# Patient Record
Sex: FEMALE | Race: WHITE | Employment: FULL TIME | URBAN - METROPOLITAN AREA
[De-identification: names, ages, dates, MRNs, and addresses within clinical notes are randomized per-mention and may not be internally consistent; named-entity substitution may affect disease eponyms.]

---

## 2023-09-18 ENCOUNTER — OFFICE VISIT (OUTPATIENT)
Age: 28
End: 2023-09-18

## 2023-09-18 VITALS
OXYGEN SATURATION: 98 % | TEMPERATURE: 98.1 F | RESPIRATION RATE: 18 BRPM | DIASTOLIC BLOOD PRESSURE: 71 MMHG | BODY MASS INDEX: 25.44 KG/M2 | SYSTOLIC BLOOD PRESSURE: 110 MMHG | HEART RATE: 89 BPM | WEIGHT: 149 LBS

## 2023-09-18 DIAGNOSIS — E86.0 DEHYDRATION AFTER EXERTION: Primary | ICD-10-CM

## 2023-09-18 DIAGNOSIS — R42 LIGHTHEADEDNESS: ICD-10-CM

## 2023-09-18 RX ORDER — ESCITALOPRAM OXALATE 20 MG/1
TABLET ORAL
COMMUNITY
Start: 2023-08-09

## 2023-09-18 ASSESSMENT — ENCOUNTER SYMPTOMS
RHINORRHEA: 0
SHORTNESS OF BREATH: 0
COLOR CHANGE: 0
WHEEZING: 0
CHEST TIGHTNESS: 0
SORE THROAT: 0

## 2023-09-19 NOTE — PATIENT INSTRUCTIONS
Push fluids - goal for minimum of 2L (64oz) daily   Go to ER with any complaints of chest pain  Follow up with PCP for new or worsening symptoms